# Patient Record
Sex: FEMALE | Race: WHITE | URBAN - METROPOLITAN AREA
[De-identification: names, ages, dates, MRNs, and addresses within clinical notes are randomized per-mention and may not be internally consistent; named-entity substitution may affect disease eponyms.]

---

## 2023-11-09 ENCOUNTER — OFFICE (OUTPATIENT)
Dept: URBAN - METROPOLITAN AREA CLINIC 8 | Facility: CLINIC | Age: 73
End: 2023-11-09

## 2023-11-09 VITALS
WEIGHT: 190 LBS | DIASTOLIC BLOOD PRESSURE: 63 MMHG | OXYGEN SATURATION: 96 % | SYSTOLIC BLOOD PRESSURE: 140 MMHG | HEIGHT: 67 IN | HEART RATE: 89 BPM

## 2023-11-09 DIAGNOSIS — Z86.010 PERSONAL HISTORY OF COLONIC POLYPS: ICD-10-CM

## 2023-11-09 DIAGNOSIS — K92.1 MELENA: ICD-10-CM

## 2023-11-09 DIAGNOSIS — K21.9 GASTRO-ESOPHAGEAL REFLUX DISEASE WITHOUT ESOPHAGITIS: ICD-10-CM

## 2023-11-09 DIAGNOSIS — Z87.19 PERSONAL HISTORY OF OTHER DISEASES OF THE DIGESTIVE SYSTEM: ICD-10-CM

## 2023-11-09 DIAGNOSIS — K59.09 OTHER CONSTIPATION: ICD-10-CM

## 2023-11-09 PROCEDURE — 99204 OFFICE O/P NEW MOD 45 MIN: CPT | Performed by: STUDENT IN AN ORGANIZED HEALTH CARE EDUCATION/TRAINING PROGRAM

## 2023-11-09 RX ORDER — SODIUM PICOSULFATE, MAGNESIUM OXIDE, AND ANHYDROUS CITRIC ACID 10; 3.5; 12 MG/160ML; G/160ML; G/160ML
LIQUID ORAL
Qty: 350 | Refills: 0 | Status: COMPLETED
Start: 2023-11-09 | End: 2023-12-12

## 2023-11-09 NOTE — SERVICEHPINOTES
Mr. Luana Luna is a  73-year-old female with a past medical history of sleep apnea, diabetes, GERD, anemia, who presents to gastro  4 several GI issues.
br
br clinic visit 11/09/2023: 
berto First of all the patient has new onset hematochezia for the last 4 months.  She reports it is intermittent sometimes going days to weeks in between episodes but sometimes feels the toilet or is seen on the toilet tissue when she wipes.  She reports about 1 month ago she also had black stool for about 3 days which resolved spontaneously.  She does not use any acid reflux medication except for Tums 2 times per day.  No nausea vomiting.  No unintentional weight loss but she has gained 30 lb unintentionally over the last 3 months.  She has some chronic constipation and has 1 bowel movement a day or every other day.  She takes magnesium for that recently but stopped it.  She also has a personal history of fatty liver disease but is unsure if she has ever had elevated liver enzymes.  She drinks 2 cups of black coffee per day.  Her brother had liver cancer and mother had breast cancer.  She previously was being seen by gastroenterologist Dr. Berg and Dr. Fernandez but they no longer practice so she is switching.  she had an EGD done last year which she reports had a hiatal hernia.  She also had a colonoscopy last year in August.  The patient reports that she had a highly suspicious polyp that was removed and she was told to repeat her colonoscopy in 6 months.  However Dr. Mcnulty  note from her referral states that repeat colonoscopy was recommended in 3-5 years.  She also has a history of pancreatitis in 2018. She had episode of severe epigastric pain and had a CT scan done which confirmed this according to her.  She was never told what the etiology was but she was given pancreatic enzymes and her symptoms resolved.  She has not had any issues with that since then and she is no longer taking enzymes.  Previous abdominal surgeries include hysterectomy.  She does not smoke, drink alcohol, or use recreational drugs.  She is here with her  today.

## 2023-11-09 NOTE — SERVICENOTES
Patient has new onset hematochezia that needs further investigation with colonoscopy.  She also has a history of precancerous polyps on her colonoscopy 1 year ago and she reports she was post a repeated in 6 months.  She is also having new onset heartburn symptoms for the last 2 years that she is using Tums daily and has history of hiatal hernia so I think we need to proceed with diagnostic EGD as well to perform biopsies to rule out H pylori  And upper GI malignancy.  As well as her history of melena 1 month ago.  In addition the patient has a history of fatty liver disease so I am checking her liver enzymes and CBC today.  She has a history of unintentional 30 lb weight gain over 3 months so I am checking her TSH as well to rule out thyroid disease given she has some chronic constipation as well.  I counseled the patient on avoiding NSAIDs and advising 2 cups black coffee per day, of diet and weight loss.  High-fiber diet.  I discussed with the patient the risks and benefits of the procedure including bleeding, infection, anesthesia related complications.  Patient agrees to proceed with the planned procedure.  All questions addressed.   I personally reviewed the patient's outside medical records [facility] prior to her  clinic visit today.

## 2023-11-10 LAB
CBC, PLATELET, NO DIFFERENTIAL: HEMATOCRIT: 42.7 % (ref 34–46.6)
CBC, PLATELET, NO DIFFERENTIAL: HEMOGLOBIN: 13.2 G/DL (ref 11.1–15.9)
CBC, PLATELET, NO DIFFERENTIAL: MCH: 24.2 PG — LOW (ref 26.6–33)
CBC, PLATELET, NO DIFFERENTIAL: MCHC: 30.9 G/DL — LOW (ref 31.5–35.7)
CBC, PLATELET, NO DIFFERENTIAL: MCV: 78 FL — LOW (ref 79–97)
CBC, PLATELET, NO DIFFERENTIAL: NRBC: (no result)
CBC, PLATELET, NO DIFFERENTIAL: PLATELETS: 268 X10E3/UL (ref 150–450)
CBC, PLATELET, NO DIFFERENTIAL: RBC: 5.46 X10E6/UL — HIGH (ref 3.77–5.28)
CBC, PLATELET, NO DIFFERENTIAL: RDW: 14.5 % (ref 11.7–15.4)
CBC, PLATELET, NO DIFFERENTIAL: WBC: 8 X10E3/UL (ref 3.4–10.8)
COMP. METABOLIC PANEL (14): A/G RATIO: 1.2 (ref 1.2–2.2)
COMP. METABOLIC PANEL (14): ALBUMIN: 3.7 G/DL — LOW (ref 3.8–4.8)
COMP. METABOLIC PANEL (14): ALKALINE PHOSPHATASE: 145 IU/L — HIGH (ref 44–121)
COMP. METABOLIC PANEL (14): ALT (SGPT): 23 IU/L (ref 0–32)
COMP. METABOLIC PANEL (14): AST (SGOT): 27 IU/L (ref 0–40)
COMP. METABOLIC PANEL (14): BILIRUBIN, TOTAL: 0.3 MG/DL (ref 0–1.2)
COMP. METABOLIC PANEL (14): BUN/CREATININE RATIO: 12 (ref 12–28)
COMP. METABOLIC PANEL (14): BUN: 15 MG/DL (ref 8–27)
COMP. METABOLIC PANEL (14): CALCIUM: 10 MG/DL (ref 8.7–10.3)
COMP. METABOLIC PANEL (14): CARBON DIOXIDE, TOTAL: 30 MMOL/L — HIGH (ref 20–29)
COMP. METABOLIC PANEL (14): CHLORIDE: 100 MMOL/L (ref 96–106)
COMP. METABOLIC PANEL (14): CREATININE: 1.3 MG/DL — HIGH (ref 0.57–1)
COMP. METABOLIC PANEL (14): EGFR: 43 ML/MIN/1.73 — LOW (ref 59–?)
COMP. METABOLIC PANEL (14): GLOBULIN, TOTAL: 3.1 G/DL (ref 1.5–4.5)
COMP. METABOLIC PANEL (14): GLUCOSE: 85 MG/DL (ref 70–99)
COMP. METABOLIC PANEL (14): POTASSIUM: 3.5 MMOL/L (ref 3.5–5.2)
COMP. METABOLIC PANEL (14): PROTEIN, TOTAL: 6.8 G/DL (ref 6–8.5)
COMP. METABOLIC PANEL (14): SODIUM: 143 MMOL/L (ref 134–144)
TRIGLYCERIDES: 244 MG/DL — HIGH (ref 0–149)
TSH: 0.96 UIU/ML (ref 0.45–4.5)

## 2023-12-12 ENCOUNTER — AMBULATORY SURGICAL CENTER (OUTPATIENT)
Dept: URBAN - METROPOLITAN AREA SURGERY 3 | Facility: SURGERY | Age: 73
End: 2023-12-12

## 2023-12-12 ENCOUNTER — OFFICE (OUTPATIENT)
Dept: URBAN - METROPOLITAN AREA PATHOLOGY 12 | Facility: PATHOLOGY | Age: 73
End: 2023-12-12

## 2023-12-12 VITALS
RESPIRATION RATE: 17 BRPM | DIASTOLIC BLOOD PRESSURE: 59 MMHG | RESPIRATION RATE: 17 BRPM | OXYGEN SATURATION: 99 % | DIASTOLIC BLOOD PRESSURE: 57 MMHG | SYSTOLIC BLOOD PRESSURE: 117 MMHG | OXYGEN SATURATION: 97 % | RESPIRATION RATE: 20 BRPM | HEART RATE: 87 BPM | WEIGHT: 184 LBS | HEART RATE: 79 BPM | OXYGEN SATURATION: 100 % | DIASTOLIC BLOOD PRESSURE: 71 MMHG | SYSTOLIC BLOOD PRESSURE: 120 MMHG | DIASTOLIC BLOOD PRESSURE: 57 MMHG | OXYGEN SATURATION: 97 % | SYSTOLIC BLOOD PRESSURE: 118 MMHG | HEIGHT: 67 IN | SYSTOLIC BLOOD PRESSURE: 117 MMHG | SYSTOLIC BLOOD PRESSURE: 117 MMHG | RESPIRATION RATE: 17 BRPM | OXYGEN SATURATION: 98 % | HEIGHT: 67 IN | SYSTOLIC BLOOD PRESSURE: 125 MMHG | DIASTOLIC BLOOD PRESSURE: 63 MMHG | DIASTOLIC BLOOD PRESSURE: 50 MMHG | RESPIRATION RATE: 20 BRPM | HEART RATE: 87 BPM | RESPIRATION RATE: 16 BRPM | SYSTOLIC BLOOD PRESSURE: 113 MMHG | RESPIRATION RATE: 14 BRPM | SYSTOLIC BLOOD PRESSURE: 125 MMHG | OXYGEN SATURATION: 96 % | HEART RATE: 87 BPM | DIASTOLIC BLOOD PRESSURE: 63 MMHG | TEMPERATURE: 98.4 F | OXYGEN SATURATION: 98 % | OXYGEN SATURATION: 100 % | HEART RATE: 83 BPM | SYSTOLIC BLOOD PRESSURE: 120 MMHG | DIASTOLIC BLOOD PRESSURE: 63 MMHG | HEART RATE: 81 BPM | HEART RATE: 83 BPM | TEMPERATURE: 98.4 F | HEART RATE: 79 BPM | SYSTOLIC BLOOD PRESSURE: 125 MMHG | OXYGEN SATURATION: 99 % | TEMPERATURE: 98.2 F | TEMPERATURE: 98.2 F | SYSTOLIC BLOOD PRESSURE: 118 MMHG | OXYGEN SATURATION: 96 % | SYSTOLIC BLOOD PRESSURE: 118 MMHG | HEART RATE: 79 BPM | OXYGEN SATURATION: 97 % | TEMPERATURE: 98.4 F | DIASTOLIC BLOOD PRESSURE: 50 MMHG | RESPIRATION RATE: 16 BRPM | WEIGHT: 184 LBS | HEART RATE: 81 BPM | SYSTOLIC BLOOD PRESSURE: 120 MMHG | WEIGHT: 184 LBS | RESPIRATION RATE: 14 BRPM | DIASTOLIC BLOOD PRESSURE: 59 MMHG | RESPIRATION RATE: 20 BRPM | OXYGEN SATURATION: 98 % | OXYGEN SATURATION: 99 % | RESPIRATION RATE: 14 BRPM | OXYGEN SATURATION: 96 % | HEART RATE: 83 BPM | RESPIRATION RATE: 16 BRPM | DIASTOLIC BLOOD PRESSURE: 50 MMHG | OXYGEN SATURATION: 100 % | DIASTOLIC BLOOD PRESSURE: 71 MMHG | DIASTOLIC BLOOD PRESSURE: 57 MMHG | DIASTOLIC BLOOD PRESSURE: 71 MMHG | DIASTOLIC BLOOD PRESSURE: 59 MMHG | HEART RATE: 81 BPM | TEMPERATURE: 98.2 F | HEIGHT: 67 IN | SYSTOLIC BLOOD PRESSURE: 113 MMHG | SYSTOLIC BLOOD PRESSURE: 113 MMHG

## 2023-12-12 DIAGNOSIS — Z86.010 PERSONAL HISTORY OF COLONIC POLYPS: ICD-10-CM

## 2023-12-12 DIAGNOSIS — K92.1 MELENA: ICD-10-CM

## 2023-12-12 DIAGNOSIS — K62.1 RECTAL POLYP: ICD-10-CM

## 2023-12-12 DIAGNOSIS — K31.89 OTHER DISEASES OF STOMACH AND DUODENUM: ICD-10-CM

## 2023-12-12 DIAGNOSIS — K44.9 DIAPHRAGMATIC HERNIA WITHOUT OBSTRUCTION OR GANGRENE: ICD-10-CM

## 2023-12-12 DIAGNOSIS — K21.9 GASTRO-ESOPHAGEAL REFLUX DISEASE WITHOUT ESOPHAGITIS: ICD-10-CM

## 2023-12-12 DIAGNOSIS — B96.81 HELICOBACTER PYLORI [H. PYLORI] AS THE CAUSE OF DISEASES CLA: ICD-10-CM

## 2023-12-12 DIAGNOSIS — K63.5 POLYP OF COLON: ICD-10-CM

## 2023-12-12 DIAGNOSIS — K29.30 CHRONIC SUPERFICIAL GASTRITIS WITHOUT BLEEDING: ICD-10-CM

## 2023-12-12 PROCEDURE — 45380 COLONOSCOPY AND BIOPSY: CPT | Performed by: STUDENT IN AN ORGANIZED HEALTH CARE EDUCATION/TRAINING PROGRAM

## 2023-12-12 PROCEDURE — 88313 SPECIAL STAINS GROUP 2: CPT | Performed by: STUDENT IN AN ORGANIZED HEALTH CARE EDUCATION/TRAINING PROGRAM

## 2023-12-12 PROCEDURE — 88342 IMHCHEM/IMCYTCHM 1ST ANTB: CPT | Performed by: STUDENT IN AN ORGANIZED HEALTH CARE EDUCATION/TRAINING PROGRAM

## 2023-12-12 PROCEDURE — 43239 EGD BIOPSY SINGLE/MULTIPLE: CPT | Mod: 51 | Performed by: STUDENT IN AN ORGANIZED HEALTH CARE EDUCATION/TRAINING PROGRAM

## 2023-12-12 PROCEDURE — 88305 TISSUE EXAM BY PATHOLOGIST: CPT | Performed by: STUDENT IN AN ORGANIZED HEALTH CARE EDUCATION/TRAINING PROGRAM

## 2023-12-12 RX ORDER — HYDROCORTISONE ACETATE 25 MG/1
SUPPOSITORY RECTAL
Qty: 14 | Refills: 2 | Status: ACTIVE
Start: 2023-12-12

## 2023-12-12 RX ORDER — PANTOPRAZOLE SODIUM 40 MG/1
40 TABLET, DELAYED RELEASE ORAL
Qty: 30 | Refills: 4 | Status: ACTIVE
Start: 2023-12-12

## 2023-12-12 NOTE — SERVICEHPINOTES
Mr. Luana Luna is a  73-year-old female with a past medical history of sleep apnea, diabetes, GERD, anemia, who presents to [place] [id number] 4 several GI issues.clinic visit 11/09/2023: brFirst of all the patient has new onset hematochezia for the last 4 months.  She reports it is intermittent sometimes going days to weeks in between episodes but sometimes feels the toilet or is seen on the toilet tissue when she wipes.  She reports about 1 month ago she also had black stool for about 3 days which resolved spontaneously.  She does not use any acid reflux medication except for Tums 2 times per day.  No nausea vomiting.  No unintentional weight loss but she has gained 30 lb unintentionally over the last 3 months.  She has some chronic constipation and has 1 bowel movement a day or every other day.  She takes magnesium for that recently but stopped it.  She also has a personal history of fatty liver disease but is unsure if she has ever had elevated liver enzymes.  She drinks 2 cups of black coffee per day.  Her brother had liver cancer and mother had breast cancer.  She previously was being seen by gastroenterologist Dr. Berg and Dr. Fernandez but they no longer practice so she is switching.  she had an EGD done last year which she reports had a hiatal hernia.  She also had a colonoscopy last year in August.  The patient reports that she had a highly suspicious polyp that was removed and she was told to repeat her colonoscopy in 6 months.  However Dr. Mcnulty  note from her referral states that repeat colonoscopy was recommended in 3-5 years.  She also has a history of pancreatitis in 2018. She had episode of severe epigastric pain and had a CT scan done which confirmed this according to her.  She was never told what the etiology was but she was given pancreatic enzymes and her symptoms resolved.  She has not had any issues with that since then and she is no longer taking enzymes.  Previous abdominal surgeries include hysterectomy.  She does not smoke, drink alcohol, or use recreational drugs.  She is here with her  today.
berto thomson    EGD/colonoscopy 12/12/23:  
br
Patient had some nausea with the prep, not on NSAIDs, not on PPI, has still had bleeding with the prep, uses Tums a lot, last BM was clear and yellow.

## 2023-12-12 NOTE — SERVICEHPINOTES
Mr. Luana Luna is a  73-year-old female with a past medical history of sleep apnea, diabetes, GERD, anemia, who presents to [place] [id number] 4 several GI issues.clinic visit 11/09/2023: brFirst of all the patient has new onset hematochezia for the last 4 months.  She reports it is intermittent sometimes going days to weeks in between episodes but sometimes feels the toilet or is seen on the toilet tissue when she wipes.  She reports about 1 month ago she also had black stool for about 3 days which resolved spontaneously.  She does not use any acid reflux medication except for Tums 2 times per day.  No nausea vomiting.  No unintentional weight loss but she has gained 30 lb unintentionally over the last 3 months.  She has some chronic constipation and has 1 bowel movement a day or every other day.  She takes magnesium for that recently but stopped it.  She also has a personal history of fatty liver disease but is unsure if she has ever had elevated liver enzymes.  She drinks 2 cups of black coffee per day.  Her brother had liver cancer and mother had breast cancer.  She previously was being seen by gastroenterologist Dr. Berg and Dr. Fernandez but they no longer practice so she is switching.  she had an EGD done last year which she reports had a hiatal hernia.  She also had a colonoscopy last year in August.  The patient reports that she had a highly suspicious polyp that was removed and she was told to repeat her colonoscopy in 6 months.  However Dr. Mcnulty  note from her referral states that repeat colonoscopy was recommended in 3-5 years.  She also has a history of pancreatitis in 2018. She had episode of severe epigastric pain and had a CT scan done which confirmed this according to her.  She was never told what the etiology was but she was given pancreatic enzymes and her symptoms resolved.  She has not had any issues with that since then and she is no longer taking enzymes.  Previous abdominal surgeries include hysterectomy.  She does not smoke, drink alcohol, or use recreational drugs.  She is here with her  today.
berto thomson    EGD/colonoscopy 12/12/23:  
br
Patient had some nausea with the prep, not on NSAIDs, not on PPI, has still had bleeding with the prep, uses Tums a lot, last BM was clear and yellow.

## 2023-12-12 NOTE — SERVICEHPINOTES
Mr. Luana Luna is a  73-year-old female with a past medical history of sleep apnea, diabetes, GERD, anemia, who presents to [place] [id number] 4 several GI issues.clinic visit 11/09/2023: brFirst of all the patient has new onset hematochezia for the last 4 months.  She reports it is intermittent sometimes going days to weeks in between episodes but sometimes feels the toilet or is seen on the toilet tissue when she wipes.  She reports about 1 month ago she also had black stool for about 3 days which resolved spontaneously.  She does not use any acid reflux medication except for Tums 2 times per day.  No nausea vomiting.  No unintentional weight loss but she has gained 30 lb unintentionally over the last 3 months.  She has some chronic constipation and has 1 bowel movement a day or every other day.  She takes magnesium for that recently but stopped it.  She also has a personal history of fatty liver disease but is unsure if she has ever had elevated liver enzymes.  She drinks 2 cups of black coffee per day.  Her brother had liver cancer and mother had breast cancer.  She previously was being seen by gastroenterologist Dr. Berg and Dr. Fernandez but they no longer practice so she is switching.  she had an EGD done last year which she reports had a hiatal hernia.  She also had a colonoscopy last year in August.  The patient reports that she had a highly suspicious polyp that was removed and she was told to repeat her colonoscopy in 6 months.  However Dr. Mcnulty  note from her referral states that repeat colonoscopy was recommended in 3-5 years.  She also has a history of pancreatitis in 2018. She had episode of severe epigastric pain and had a CT scan done which confirmed this according to her.  She was never told what the etiology was but she was given pancreatic enzymes and her symptoms resolved.  She has not had any issues with that since then and she is no longer taking enzymes.  Previous abdominal surgeries include hysterectomy.  She does not smoke, drink alcohol, or use recreational drugs.  She is here with her  today.
berto thomsno    EGD/colonoscopy 12/12/23:  
br
Patient had some nausea with the prep, not on NSAIDs, not on PPI, has still had bleeding with the prep, uses Tums a lot, last BM was clear and yellow.

## 2023-12-14 LAB
GASTRO ONE PATHOLOGY: PDF REPORT: (no result)

## 2024-01-04 ENCOUNTER — OFFICE (OUTPATIENT)
Dept: URBAN - METROPOLITAN AREA CLINIC 8 | Facility: CLINIC | Age: 74
End: 2024-01-04

## 2024-01-04 VITALS
HEIGHT: 67 IN | HEART RATE: 79 BPM | OXYGEN SATURATION: 97 % | WEIGHT: 193 LBS | DIASTOLIC BLOOD PRESSURE: 64 MMHG | SYSTOLIC BLOOD PRESSURE: 145 MMHG

## 2024-01-04 DIAGNOSIS — A04.8 OTHER SPECIFIED BACTERIAL INTESTINAL INFECTIONS: ICD-10-CM

## 2024-01-04 DIAGNOSIS — R10.13 EPIGASTRIC PAIN: ICD-10-CM

## 2024-01-04 DIAGNOSIS — Z87.19 PERSONAL HISTORY OF OTHER DISEASES OF THE DIGESTIVE SYSTEM: ICD-10-CM

## 2024-01-04 DIAGNOSIS — R10.12 LEFT UPPER QUADRANT PAIN: ICD-10-CM

## 2024-01-04 DIAGNOSIS — K64.9 UNSPECIFIED HEMORRHOIDS: ICD-10-CM

## 2024-01-04 PROCEDURE — 99213 OFFICE O/P EST LOW 20 MIN: CPT | Performed by: STUDENT IN AN ORGANIZED HEALTH CARE EDUCATION/TRAINING PROGRAM

## 2024-01-04 NOTE — SERVICENOTES
The patient has some epigastric and left upper quadrant pain today that I think is most likely related to a side effect of the antibiotic regimen that she has been on for H pylori eye.  I think she has some baseline dyspepsia, reflux,  and functional abdominal pain which is likely exacerbated by the antibiotics.  I think at this point we need to sit tight and see how her symptoms resolve now that she is going to complete the antibiotics tomorrow and have her follow up in couple of months to see how she is feeling.  If she continues to have symptoms despite H pylori treatment and PPI use then we may need to do further workup.  She will also need to be checked for eradication of H pylori at her next visit.  Continue to use Anusol suppositories as needed for hemorrhoidal bleeding.  I personally reviewed her previous medical records for her visit today.  Recall colonoscopy is set for 10 years.